# Patient Record
Sex: FEMALE | Race: WHITE | HISPANIC OR LATINO | Employment: FULL TIME | URBAN - METROPOLITAN AREA
[De-identification: names, ages, dates, MRNs, and addresses within clinical notes are randomized per-mention and may not be internally consistent; named-entity substitution may affect disease eponyms.]

---
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
## 2018-01-14 VITALS
HEIGHT: 62 IN | DIASTOLIC BLOOD PRESSURE: 62 MMHG | SYSTOLIC BLOOD PRESSURE: 110 MMHG | BODY MASS INDEX: 23.6 KG/M2 | WEIGHT: 128.25 LBS

## 2019-12-13 PROBLEM — H10.13 ALLERGIC CONJUNCTIVITIS OF BOTH EYES: Status: ACTIVE | Noted: 2019-12-13

## 2019-12-13 PROBLEM — J30.1 CHRONIC SEASONAL ALLERGIC RHINITIS DUE TO POLLEN: Status: ACTIVE | Noted: 2019-12-13

## 2019-12-13 PROBLEM — L20.84 INTRINSIC ATOPIC DERMATITIS: Status: ACTIVE | Noted: 2019-12-13

## 2019-12-13 PROBLEM — Z91.048 ALLERGY TO MOLD: Status: ACTIVE | Noted: 2019-12-13

## 2019-12-13 PROBLEM — J30.89 ALLERGIC RHINITIS DUE TO HOUSE DUST MITE: Status: ACTIVE | Noted: 2019-12-13

## 2020-01-21 ENCOUNTER — TELEPHONE (OUTPATIENT)
Dept: FAMILY MEDICINE CLINIC | Facility: CLINIC | Age: 20
End: 2020-01-21

## 2020-02-03 NOTE — TELEPHONE ENCOUNTER
02/03/20 10:16 AM     Thank you for your request  Your request has been received, reviewed, and the patient chart updated  The PCP has successfully been removed with a patient attribution note  This message will now be completed      Thank you  Pankaj Morse

## 2020-11-17 ENCOUNTER — OFFICE VISIT (OUTPATIENT)
Dept: URGENT CARE | Facility: MEDICAL CENTER | Age: 20
End: 2020-11-17
Payer: COMMERCIAL

## 2020-11-17 VITALS
TEMPERATURE: 98.9 F | WEIGHT: 125 LBS | HEART RATE: 98 BPM | HEIGHT: 60 IN | BODY MASS INDEX: 24.54 KG/M2 | RESPIRATION RATE: 18 BRPM | OXYGEN SATURATION: 98 %

## 2020-11-17 DIAGNOSIS — Z20.822 ENCOUNTER FOR LABORATORY TESTING FOR COVID-19 VIRUS: Primary | ICD-10-CM

## 2020-11-17 PROCEDURE — G0382 LEV 3 HOSP TYPE B ED VISIT: HCPCS | Performed by: PHYSICIAN ASSISTANT

## 2020-11-17 PROCEDURE — S9083 URGENT CARE CENTER GLOBAL: HCPCS | Performed by: PHYSICIAN ASSISTANT

## 2020-11-17 PROCEDURE — U0003 INFECTIOUS AGENT DETECTION BY NUCLEIC ACID (DNA OR RNA); SEVERE ACUTE RESPIRATORY SYNDROME CORONAVIRUS 2 (SARS-COV-2) (CORONAVIRUS DISEASE [COVID-19]), AMPLIFIED PROBE TECHNIQUE, MAKING USE OF HIGH THROUGHPUT TECHNOLOGIES AS DESCRIBED BY CMS-2020-01-R: HCPCS | Performed by: PHYSICIAN ASSISTANT

## 2020-11-17 RX ORDER — TRAZODONE HYDROCHLORIDE 100 MG/1
100 TABLET ORAL
COMMUNITY

## 2020-11-19 LAB — SARS-COV-2 RNA SPEC QL NAA+PROBE: NOT DETECTED

## 2024-02-21 PROBLEM — H10.13 ALLERGIC CONJUNCTIVITIS OF BOTH EYES: Status: RESOLVED | Noted: 2019-12-13 | Resolved: 2024-02-21

## 2024-10-22 ENCOUNTER — OFFICE VISIT (OUTPATIENT)
Dept: URGENT CARE | Facility: CLINIC | Age: 24
End: 2024-10-22
Payer: COMMERCIAL

## 2024-10-22 VITALS
SYSTOLIC BLOOD PRESSURE: 113 MMHG | RESPIRATION RATE: 18 BRPM | DIASTOLIC BLOOD PRESSURE: 57 MMHG | TEMPERATURE: 98.9 F | OXYGEN SATURATION: 100 % | HEART RATE: 90 BPM

## 2024-10-22 DIAGNOSIS — R10.31 RIGHT LOWER QUADRANT PAIN: Primary | ICD-10-CM

## 2024-10-22 PROCEDURE — S9088 SERVICES PROVIDED IN URGENT: HCPCS | Performed by: PHYSICIAN ASSISTANT

## 2024-10-22 PROCEDURE — 99214 OFFICE O/P EST MOD 30 MIN: CPT | Performed by: PHYSICIAN ASSISTANT

## 2024-10-22 NOTE — PROGRESS NOTES
[facility]        NAME: Carolyn Russ is a 24 y.o. female  : 2000    MRN: [id number]  DATE: 2024  TIME: 7:36 PM    Assessment and Plan   Right lower quadrant pain [R10.31]  1. Right lower quadrant pain          ED for further evaluation.    Patient Instructions       ED for further evaluation.    If tests have been performed at [facility], our office will contact you with results if changes need to be made to the care plan discussed with you at the visit.  You can review your full results on [facility] MyChart.    Chief Complaint     Chief Complaint   Patient presents with    Abdominal Pain     C/o ovarian cyst x 2 weeks rlq         History of Present Illness       Patient is a 24 year old female presenting to [facility] with right lower quadrant abdominal pain.  Patient reports pain has been intermittent for a few months and worsened today.  Pain is constant and described as a pressure sensation.   Pain is worse with direct palpation.  Pt believes it is an ovarian cyst but has never had any imaging or official diagnosis, just assumes.     Abdominal Pain  This is a new problem. The current episode started more than 1 month ago. The onset quality is gradual. The problem occurs constantly. The problem has been gradually worsening. The pain is located in the RLQ. Pertinent negatives include no arthralgias, dysuria, fever, hematuria or vomiting.       Review of Systems   Review of Systems   Constitutional:  Negative for chills and fever.   HENT:  Negative for ear pain and sore throat.    Eyes:  Negative for pain and visual disturbance.   Respiratory:  Negative for cough and shortness of breath.    Cardiovascular:  Negative for chest pain and palpitations.   Gastrointestinal:  Positive for abdominal pain. Negative for vomiting.   Genitourinary:  Negative for dysuria and hematuria.   Musculoskeletal:  Negative for arthralgias and back pain.   Skin:  Negative for color change and rash.    Neurological:  Negative for seizures and syncope.   All other systems reviewed and are negative.        Current Medications       Current Outpatient Medications:     montelukast (SINGULAIR) 10 mg tablet, Take 1 tablet (10 mg total) by mouth daily at bedtime (Patient not taking: Reported on 11/17/2020), Disp: 30 tablet, Rfl: 11    norgestimate-ethinyl estradiol (SPRINTEC 28) 0.25-35 MG-MCG per tablet, Take 1 tablet by mouth daily, Disp: , Rfl:     sertraline (ZOLOFT) 50 mg tablet, Take 50 mg by mouth daily (Patient not taking: Reported on 10/22/2024), Disp: , Rfl:     traZODone (DESYREL) 100 mg tablet, Take 100 mg by mouth daily at bedtime (Patient not taking: Reported on 10/22/2024), Disp: , Rfl:     Current Allergies     Allergies as of 10/22/2024    (No Known Allergies)            The following portions of the patient's history were reviewed and updated as appropriate: allergies, current medications, past family history, past medical history, past social history, past surgical history and problem list.     Past Medical History:   Diagnosis Date    Allergies     Anxiety     Asthma     Wears glasses        Past Surgical History:   Procedure Laterality Date    OTHER SURGICAL HISTORY      repair of eyelid after trauma     WISDOM TOOTH EXTRACTION      X4        Family History   Problem Relation Age of Onset    Asthma Mother     Allergies Mother     Arthritis Mother     Cirrhosis Paternal Grandfather         LIVER    Liver cancer Paternal Grandfather     Diabetes Other     Obesity Father     Allergies Sister          Medications have been verified.        Objective   /57   Pulse 90   Temp 98.9 °F (37.2 °C)   Resp 18   SpO2 100%   No LMP recorded.       Physical Exam     Physical Exam  Constitutional:       Appearance: Normal appearance.   HENT:      Head: Normocephalic and atraumatic.      Nose: Nose normal.      Mouth/Throat:      Mouth: Mucous membranes are moist.   Eyes:      Extraocular Movements:  Extraocular movements intact.      Conjunctiva/sclera: Conjunctivae normal.      Pupils: Pupils are equal, round, and reactive to light.   Cardiovascular:      Rate and Rhythm: Normal rate.   Pulmonary:      Effort: Pulmonary effort is normal.   Abdominal:      Tenderness: There is abdominal tenderness (RLQ).   Musculoskeletal:         General: Normal range of motion.      Cervical back: Normal range of motion and neck supple.   Skin:     General: Skin is warm and dry.      Capillary Refill: Capillary refill takes less than 2 seconds.   Neurological:      General: No focal deficit present.      Mental Status: She is alert and oriented to person, place, and time.   Psychiatric:         Mood and Affect: Mood normal.         Behavior: Behavior normal.

## 2024-11-24 ENCOUNTER — OFFICE VISIT (OUTPATIENT)
Dept: URGENT CARE | Facility: CLINIC | Age: 24
End: 2024-11-24
Payer: COMMERCIAL

## 2024-11-24 VITALS
RESPIRATION RATE: 18 BRPM | DIASTOLIC BLOOD PRESSURE: 57 MMHG | HEART RATE: 84 BPM | TEMPERATURE: 98.5 F | SYSTOLIC BLOOD PRESSURE: 116 MMHG | OXYGEN SATURATION: 100 %

## 2024-11-24 DIAGNOSIS — J02.9 SORE THROAT: Primary | ICD-10-CM

## 2024-11-24 DIAGNOSIS — R09.82 POSTNASAL DRIP: ICD-10-CM

## 2024-11-24 PROCEDURE — 99213 OFFICE O/P EST LOW 20 MIN: CPT | Performed by: PHYSICIAN ASSISTANT

## 2024-11-24 PROCEDURE — S9088 SERVICES PROVIDED IN URGENT: HCPCS | Performed by: PHYSICIAN ASSISTANT

## 2024-11-24 RX ORDER — BROMPHENIRAMINE MALEATE, PSEUDOEPHEDRINE HYDROCHLORIDE, AND DEXTROMETHORPHAN HYDROBROMIDE 2; 30; 10 MG/5ML; MG/5ML; MG/5ML
5 SYRUP ORAL 4 TIMES DAILY PRN
Qty: 118 ML | Refills: 0 | Status: SHIPPED | OUTPATIENT
Start: 2024-11-24

## 2024-11-24 NOTE — PATIENT INSTRUCTIONS
Discussed symptoms most likely related to postnasal drip.  Take Bromfed as instructed.  Continue over-the-counter ibuprofen or Tylenol for any pain or discomfort.  All patient's questions answered and is agreeable this plan.

## 2024-11-24 NOTE — PROGRESS NOTES
[facility]        NAME: Carolyn Russ is a 24 y.o. female  : 2000    MRN: [id number]  DATE: 2024  TIME: 2:58 PM    Assessment and Plan   Sore throat [J02.9]  1. Sore throat  brompheniramine-pseudoephedrine-DM 30-2-10 MG/5ML syrup      2. Postnasal drip              Patient Instructions     Patient Instructions   Discussed symptoms most likely related to postnasal drip.  Take Bromfed as instructed.  Continue over-the-counter ibuprofen or Tylenol for any pain or discomfort.  All patient's questions answered and is agreeable this plan.      Follow up with PCP in 3-5 days.  Proceed to  ER if symptoms worsen.    Chief Complaint     Chief Complaint   Patient presents with    Cold Like Symptoms     Sore throat x4 days, chest pain, cough x 4 today           History of Present Illness       Patient is a 24-year-old female presenting today with cold-like symptoms x 3 to 4 days.  Patient notes over the last few days she has had some nasal congestion, postnasal drip, sore throat and a cough, has taken occasional over-the-counter ibuprofen which helps slightly.  Denies any known sick contacts.  Denies fever, chest tightness, SOB, N/B/D.        Review of Systems   Review of Systems   Constitutional:  Negative for chills, fatigue and fever.   HENT:  Positive for congestion, postnasal drip and sore throat.    Eyes:  Negative for redness and itching.   Respiratory:  Positive for cough. Negative for chest tightness and shortness of breath.    Cardiovascular:  Negative for chest pain.   Gastrointestinal:  Negative for diarrhea, nausea and vomiting.   Musculoskeletal:  Negative for arthralgias and myalgias.   Neurological:  Negative for light-headedness and headaches.         Current Medications       Current Outpatient Medications:     brompheniramine-pseudoephedrine-DM 30-2-10 MG/5ML syrup, Take 5 mL by mouth 4 (four) times a day as needed for allergies, Disp: 118 mL, Rfl: 0    montelukast (SINGULAIR) 10  mg tablet, Take 1 tablet (10 mg total) by mouth daily at bedtime (Patient not taking: Reported on 11/17/2020), Disp: 30 tablet, Rfl: 11    norgestimate-ethinyl estradiol (SPRINTEC 28) 0.25-35 MG-MCG per tablet, Take 1 tablet by mouth daily, Disp: , Rfl:     sertraline (ZOLOFT) 50 mg tablet, Take 50 mg by mouth daily (Patient not taking: Reported on 10/22/2024), Disp: , Rfl:     traZODone (DESYREL) 100 mg tablet, Take 100 mg by mouth daily at bedtime (Patient not taking: Reported on 10/22/2024), Disp: , Rfl:     Current Allergies     Allergies as of 11/24/2024    (No Known Allergies)            The following portions of the patient's history were reviewed and updated as appropriate: allergies, current medications, past family history, past medical history, past social history, past surgical history and problem list.     Past Medical History:   Diagnosis Date    Allergies     Anxiety     Asthma     Wears glasses        Past Surgical History:   Procedure Laterality Date    OTHER SURGICAL HISTORY      repair of eyelid after trauma     WISDOM TOOTH EXTRACTION      X4        Family History   Problem Relation Age of Onset    Asthma Mother     Allergies Mother     Arthritis Mother     Cirrhosis Paternal Grandfather         LIVER    Liver cancer Paternal Grandfather     Diabetes Other     Obesity Father     Allergies Sister          Medications have been verified.        Objective   /57   Pulse 84   Temp 98.5 °F (36.9 °C)   Resp 18   SpO2 100%        Physical Exam     Physical Exam  Vitals reviewed.   Constitutional:       General: She is not in acute distress.     Appearance: She is not ill-appearing.   HENT:      Head: Normocephalic.      Right Ear: Tympanic membrane, ear canal and external ear normal.      Left Ear: Tympanic membrane, ear canal and external ear normal.      Nose: Congestion present.      Mouth/Throat:      Mouth: Mucous membranes are moist.      Pharynx: No oropharyngeal exudate.      Comments:  Mild erythema and cobblestoning of oropharynx consistent with postnasal drip  Cardiovascular:      Rate and Rhythm: Normal rate and regular rhythm.      Pulses: Normal pulses.      Heart sounds: Normal heart sounds.   Pulmonary:      Effort: Pulmonary effort is normal.      Breath sounds: Normal breath sounds.   Lymphadenopathy:      Cervical: No cervical adenopathy.   Skin:     General: Skin is warm.      Capillary Refill: Capillary refill takes less than 2 seconds.   Neurological:      General: No focal deficit present.      Mental Status: She is alert and oriented to person, place, and time.

## 2024-12-03 ENCOUNTER — OFFICE VISIT (OUTPATIENT)
Dept: URGENT CARE | Facility: CLINIC | Age: 24
End: 2024-12-03
Payer: COMMERCIAL

## 2024-12-03 VITALS
TEMPERATURE: 98.6 F | BODY MASS INDEX: 28.24 KG/M2 | SYSTOLIC BLOOD PRESSURE: 104 MMHG | HEART RATE: 98 BPM | WEIGHT: 144.6 LBS | DIASTOLIC BLOOD PRESSURE: 56 MMHG | OXYGEN SATURATION: 100 %

## 2024-12-03 DIAGNOSIS — J02.9 SORE THROAT: Primary | ICD-10-CM

## 2024-12-03 LAB — S PYO AG THROAT QL: NEGATIVE

## 2024-12-03 PROCEDURE — 99213 OFFICE O/P EST LOW 20 MIN: CPT | Performed by: PHYSICIAN ASSISTANT

## 2024-12-03 PROCEDURE — 87880 STREP A ASSAY W/OPTIC: CPT | Performed by: PHYSICIAN ASSISTANT

## 2024-12-03 PROCEDURE — S9088 SERVICES PROVIDED IN URGENT: HCPCS | Performed by: PHYSICIAN ASSISTANT

## 2024-12-03 RX ORDER — PREDNISONE 50 MG/1
50 TABLET ORAL DAILY
Qty: 5 TABLET | Refills: 0 | Status: SHIPPED | OUTPATIENT
Start: 2024-12-03 | End: 2024-12-08

## 2024-12-03 NOTE — PATIENT INSTRUCTIONS
I have prescribed an antibiotic for the infection.  Please take the antibiotic as prescribed and finish the entire prescription.  Take an over the counter probiotic or eat yogurt with live cultures in it (activia) to keep good bacteria in the gut and help prevent diarrhea.  Wash hands frequently to prevent the spread of infection.  Can use over the counter cough and cold medications to help with symptoms.  Ibuprofen and/or tylenol as needed for pain or fever.  If not improving over the next 7-10 days, follow up with PCP.      Prednisone as directed.

## 2024-12-03 NOTE — PROGRESS NOTES
[facility]    NAME: Carolyn Russ is a 24 y.o. female  : 2000    MRN: [id number]  DATE: December 3, 2024  TIME: 5:23 PM    Assessment and Plan   Sore throat [J02.9]  1. Sore throat  POCT rapid strepA    amoxicillin-clavulanate (AUGMENTIN) 875-125 mg per tablet    predniSONE 50 mg tablet          Patient Instructions     Patient Instructions   I have prescribed an antibiotic for the infection.  Please take the antibiotic as prescribed and finish the entire prescription.  Take an over the counter probiotic or eat yogurt with live cultures in it (activia) to keep good bacteria in the gut and help prevent diarrhea.  Wash hands frequently to prevent the spread of infection.  Can use over the counter cough and cold medications to help with symptoms.  Ibuprofen and/or tylenol as needed for pain or fever.  If not improving over the next 7-10 days, follow up with PCP.      Prednisone as directed.      Chief Complaint     Chief Complaint   Patient presents with    Sore Throat     Ear pain, arm right about 3 weeks        History of Present Illness   24 year old female here with complaint of sore throat, right ear pain for the past 3 weeks.  No improvement with medication she was given last visit.        Review of Systems   Review of Systems   Constitutional:  Negative for appetite change, chills and fever.   HENT:  Positive for ear pain and sore throat. Negative for congestion, ear discharge, facial swelling, postnasal drip, sinus pressure and sneezing.    Respiratory:  Positive for cough. Negative for shortness of breath and wheezing.    Neurological:  Negative for headaches.       Current Medications     Current Outpatient Medications:     amoxicillin-clavulanate (AUGMENTIN) 875-125 mg per tablet, Take 1 tablet by mouth every 12 (twelve) hours for 10 days, Disp: 20 tablet, Rfl: 0    predniSONE 50 mg tablet, Take 1 tablet (50 mg total) by mouth daily for 5 days, Disp: 5 tablet, Rfl: 0     brompheniramine-pseudoephedrine-DM 30-2-10 MG/5ML syrup, Take 5 mL by mouth 4 (four) times a day as needed for allergies, Disp: 118 mL, Rfl: 0    montelukast (SINGULAIR) 10 mg tablet, Take 1 tablet (10 mg total) by mouth daily at bedtime (Patient not taking: Reported on 11/17/2020), Disp: 30 tablet, Rfl: 11    norgestimate-ethinyl estradiol (SPRINTEC 28) 0.25-35 MG-MCG per tablet, Take 1 tablet by mouth daily, Disp: , Rfl:     sertraline (ZOLOFT) 50 mg tablet, Take 50 mg by mouth daily (Patient not taking: Reported on 10/22/2024), Disp: , Rfl:     traZODone (DESYREL) 100 mg tablet, Take 100 mg by mouth daily at bedtime (Patient not taking: Reported on 10/22/2024), Disp: , Rfl:     Current Allergies     Allergies as of 12/03/2024    (No Known Allergies)          The following portions of the patient's history were reviewed and updated as appropriate: allergies, current medications, past family history, past medical history, past social history, past surgical history and problem list.   Past Medical History:   Diagnosis Date    Allergies     Anxiety     Asthma     Wears glasses      Past Surgical History:   Procedure Laterality Date    OTHER SURGICAL HISTORY      repair of eyelid after trauma     WISDOM TOOTH EXTRACTION      X4      Family History   Problem Relation Age of Onset    Asthma Mother     Allergies Mother     Arthritis Mother     Cirrhosis Paternal Grandfather         LIVER    Liver cancer Paternal Grandfather     Diabetes Other     Obesity Father     Allergies Sister      Social History     Socioeconomic History    Marital status: Single     Spouse name: Not on file    Number of children: 0    Years of education: Not on file    Highest education level: Not on file   Occupational History    Occupation: [employer]      Comment: full-time    Tobacco Use    Smoking status: Never    Smokeless tobacco: Never   Vaping Use    Vaping status: Never Used   Substance and Sexual Activity    Alcohol use: Not Currently      Comment: No use    Drug use: Never     Comment: No use    Sexual activity: Not on file   Other Topics Concern    Not on file   Social History Narrative    Always uses seatbelts    No caffeine use        Patient lives in an apartment     Eelctric/forced hot air heat     No basement     No dehumidifier     Humidifier used daily     No air  or purifiers     Home is smoke free     [place]     Carpet yung in the bedroom         Cat (Darline) and she is allowed in the bedroom     Dog (Carolyn and Elida) they are allowed in the bedroom         Caffeine: 1 cup of hot tea daily                     Soda is consumed rarely     Chocolate consumed rarely         Patient has 3 roommates      Social Drivers of Health     Financial Resource Strain: Not on file   Food Insecurity: Not on file   Transportation Needs: Not on file   Physical Activity: Inactive (12/13/2019)    Exercise Vital Sign     Days of Exercise per Week: 0 days     Minutes of Exercise per Session: 0 min   Stress: Not on file   Social Connections: Not on file   Intimate Partner Violence: Not on file   Housing Stability: Not on file     Medications have been verified.    Objective   /56   Pulse 98   Temp 98.6 °F (37 °C)   Wt 65.6 kg (144 lb 9.6 oz)   SpO2 100%   BMI 28.24 kg/m²      Physical Exam   Physical Exam  Vitals and nursing note reviewed.   Constitutional:       General: She is not in acute distress.     Appearance: She is well-developed.   HENT:      Head: Normocephalic and atraumatic.      Right Ear: Tympanic membrane normal. Tympanic membrane is not erythematous.      Left Ear: Tympanic membrane normal. Tympanic membrane is not erythematous.      Nose: Congestion present. No mucosal edema.      Right Sinus: No maxillary sinus tenderness or frontal sinus tenderness.      Left Sinus: No maxillary sinus tenderness or frontal sinus tenderness.      Mouth/Throat:      Pharynx: Posterior oropharyngeal erythema  present. No oropharyngeal exudate.   Eyes:      Conjunctiva/sclera: Conjunctivae normal.   Cardiovascular:      Rate and Rhythm: Normal rate and regular rhythm.      Heart sounds: Normal heart sounds. No murmur heard.

## 2025-03-10 ENCOUNTER — OFFICE VISIT (OUTPATIENT)
Dept: SURGERY | Facility: CLINIC | Age: 25
End: 2025-03-10
Payer: COMMERCIAL

## 2025-03-10 ENCOUNTER — TELEPHONE (OUTPATIENT)
Dept: SURGERY | Facility: CLINIC | Age: 25
End: 2025-03-10

## 2025-03-10 VITALS
HEART RATE: 112 BPM | TEMPERATURE: 98.1 F | OXYGEN SATURATION: 100 % | BODY MASS INDEX: 25.95 KG/M2 | SYSTOLIC BLOOD PRESSURE: 96 MMHG | WEIGHT: 141 LBS | HEIGHT: 62 IN | DIASTOLIC BLOOD PRESSURE: 50 MMHG

## 2025-03-10 DIAGNOSIS — L05.91 INFECTED PILONIDAL CYST: Primary | ICD-10-CM

## 2025-03-10 PROCEDURE — 87070 CULTURE OTHR SPECIMN AEROBIC: CPT | Performed by: PHYSICIAN ASSISTANT

## 2025-03-10 PROCEDURE — 99203 OFFICE O/P NEW LOW 30 MIN: CPT | Performed by: PHYSICIAN ASSISTANT

## 2025-03-10 PROCEDURE — 87077 CULTURE AEROBIC IDENTIFY: CPT | Performed by: PHYSICIAN ASSISTANT

## 2025-03-10 PROCEDURE — 10080 I&D PILONIDAL CYST SIMPLE: CPT | Performed by: PHYSICIAN ASSISTANT

## 2025-03-10 PROCEDURE — 87205 SMEAR GRAM STAIN: CPT | Performed by: PHYSICIAN ASSISTANT

## 2025-03-10 RX ORDER — SULFAMETHOXAZOLE AND TRIMETHOPRIM 800; 160 MG/1; MG/1
1 TABLET ORAL EVERY 12 HOURS SCHEDULED
Qty: 14 TABLET | Refills: 0 | Status: SHIPPED | OUTPATIENT
Start: 2025-03-10 | End: 2025-03-11 | Stop reason: SDUPTHER

## 2025-03-10 NOTE — TELEPHONE ENCOUNTER
Patient calling in. Would like her prescription for the Bactrim that was prescribed today by Agustín Park PA-C be sent to : [id number] - [place] - [address] [facility] [address]

## 2025-03-10 NOTE — ASSESSMENT & PLAN NOTE
24 F with chronic hx of pilonidal disease x 7 years requiring 3-4 prior I&D presenting with acute pain and swelling without drainage. On exam there is a fluctuant pilonidal abscess. Advised I&D in office today and informed verbal consent obtained. Will start antibiotics. She expressed interest in definitive treatment, will arrange follow-up with Dr. Catherine to discuss pilonidal cystectomy.    Procedure completed with single straight incision over area of maximal fluctuance, using a prior scar. Large volume purulent fluid expressed and small strip of packing placed with instructions to remove tomorrow.    Orders:    sulfamethoxazole-trimethoprim (BACTRIM DS) 800-160 mg per tablet; Take 1 tablet by mouth every 12 (twelve) hours for 7 days

## 2025-03-10 NOTE — PROGRESS NOTES
Name: Carolyn Russ      : 2000      MRN: [id number]  Encounter Provider: Agustín Park PA-C  Encounter Date: 3/10/2025   Encounter department: [facility] GENERAL SURGERY [place]  :  Assessment & Plan  Infected pilonidal cyst  24 F with chronic hx of pilonidal disease x 7 years requiring 3-4 prior I&D presenting with acute pain and swelling without drainage. On exam there is a fluctuant pilonidal abscess. Advised I&D in office today and informed verbal consent obtained. Will start antibiotics. She expressed interest in definitive treatment, will arrange follow-up with Dr. Catherine to discuss pilonidal cystectomy.    Procedure completed with single straight incision over area of maximal fluctuance, using a prior scar. Large volume purulent fluid expressed and small strip of packing placed with instructions to remove tomorrow.    Orders:    sulfamethoxazole-trimethoprim (BACTRIM DS) 800-160 mg per tablet; Take 1 tablet by mouth every 12 (twelve) hours for 7 days        History of Present Illness   HPI  Carolyn Russ is a 24 y.o. female who presents with a recurrent pilonidal cyst that she has been dealing with since she was 17. She had a couple of I&D's but never had the sack removed. This recent flare up she has been dealing with for a week. Theres swelling and redness but no drainage or fevers/chills. She is not on any abx right now. Pt suffers from a aching pain that prevent her from sitting and causes issues with having BM. Pt takes IBU but theres no relief. Patient denies any allergies and denies any blood thinners. SPENCER Giraldo  History obtained from: patient    Review of Systems   All other systems reviewed and are negative.    Past Medical History   Past Medical History:   Diagnosis Date    Allergies     Anxiety     Asthma     Wears glasses      Past Surgical History:   Procedure Laterality Date    OTHER SURGICAL HISTORY      repair of eyelid after trauma     WISDOM TOOTH EXTRACTION      X4   "    Family History   Problem Relation Age of Onset    Asthma Mother     Allergies Mother     Arthritis Mother     Cirrhosis Paternal Grandfather         LIVER    Liver cancer Paternal Grandfather     Diabetes Other     Obesity Father     Allergies Sister       reports that she has never smoked. She has never used smokeless tobacco. She reports that she does not currently use alcohol. She reports that she does not use drugs.  Current Outpatient Medications   Medication Instructions    sulfamethoxazole-trimethoprim (BACTRIM DS) 800-160 mg per tablet 1 tablet, Oral, Every 12 hours scheduled   No Known Allergies      Objective   BP 96/50 (BP Location: Left arm, Patient Position: Sitting, Cuff Size: Standard)   Pulse (!) 112   Temp 98.1 °F (36.7 °C) (Temporal)   Ht 5' 2\" (1.575 m)   Wt 64 kg (141 lb)   SpO2 100%   BMI 25.79 kg/m²      Physical Exam  Vitals and nursing note reviewed.   Constitutional:       General: She is not in acute distress.     Appearance: She is well-developed. She is not diaphoretic.   HENT:      Head: Normocephalic and atraumatic.   Eyes:      Conjunctiva/sclera: Conjunctivae normal.      Pupils: Pupils are equal, round, and reactive to light.   Pulmonary:      Effort: No respiratory distress.   Musculoskeletal:         General: Normal range of motion.      Cervical back: Normal range of motion.   Skin:     General: Skin is warm and dry.      Capillary Refill: Capillary refill takes less than 2 seconds.      Comments: 5 cm infected pilonidal cyst abscess without open wound or drainage. Small scar noted just left of midline.   Neurological:      Mental Status: She is alert and oriented to person, place, and time.   Psychiatric:         Behavior: Behavior normal.       Incision and Drainage    Date/Time: 3/10/2025 10:00 AM    Performed by: Agustín Park PA-C  Authorized by: Agustín Park PA-C  [place] Protocol:  procedure performed by consultantConsent: Verbal consent " "obtained.  Risks and benefits: risks, benefits and alternatives were discussed  Consent given by: patient  Patient understanding: patient does not state understanding of the procedure being performed  Patient identity confirmed: verbally with patient    Patient location:  Clinic  Location:     Type:  Abscess and pilonidal cyst    Size:  5  Pre-procedure details:     Skin preparation:  Betadine  Anesthesia (see MAR for exact dosages):     Anesthesia method:  Local infiltration    Local anesthetic:  Lidocaine 1% WITH epi  Procedure details:     Complexity:  Intermediate    Incision types:  Single straight    Scalpel blade:  11    Approach:  Open    Incision depth:  Subcutaneous    Wound management:  Probed and deloculated and irrigated with saline    Drainage:  Purulent    Drainage amount:  Copious    Wound treatment:  Packing placed    Packing materials:  1/4 in gauze    Amount 1/4\":  6 inches impregnated with silversorb gel  Post-procedure details:     Patient tolerance of procedure:  Tolerated well, no immediate complications        "

## 2025-03-11 RX ORDER — SULFAMETHOXAZOLE AND TRIMETHOPRIM 800; 160 MG/1; MG/1
1 TABLET ORAL EVERY 12 HOURS SCHEDULED
Qty: 14 TABLET | Refills: 0 | Status: SHIPPED | OUTPATIENT
Start: 2025-03-11 | End: 2025-03-19

## 2025-03-14 LAB
BACTERIA WND AEROBE CULT: ABNORMAL
BACTERIA WND AEROBE CULT: ABNORMAL
GRAM STN SPEC: ABNORMAL

## 2025-03-18 NOTE — PROGRESS NOTES
Name: Carolyn Russ      : 2000      MRN: [id number]  Encounter Provider: Nahid Catherine MD  Encounter Date: 3/19/2025   Encounter department: [facility] GENERAL SURGERY [place]  :  Assessment & Plan        History of Present Illness   HPI  Carolyn Russ is a 24 y.o. female who presents for a wound check pilonidal cyst, discuss surgery       Review of Systems       Objective   There were no vitals taken for this visit.     Physical Exam

## 2025-03-19 ENCOUNTER — OFFICE VISIT (OUTPATIENT)
Dept: SURGERY | Facility: CLINIC | Age: 25
End: 2025-03-19
Payer: COMMERCIAL

## 2025-03-19 VITALS
DIASTOLIC BLOOD PRESSURE: 72 MMHG | HEART RATE: 95 BPM | TEMPERATURE: 98.3 F | BODY MASS INDEX: 25.76 KG/M2 | HEIGHT: 62 IN | OXYGEN SATURATION: 99 % | SYSTOLIC BLOOD PRESSURE: 114 MMHG | WEIGHT: 140 LBS

## 2025-03-19 DIAGNOSIS — L05.91 INFECTED PILONIDAL CYST: Primary | ICD-10-CM

## 2025-03-19 PROCEDURE — 99212 OFFICE O/P EST SF 10 MIN: CPT | Performed by: SURGERY

## 2025-03-19 NOTE — ASSESSMENT & PLAN NOTE
Patient returns for routine scheduled follow-up status post I&D of an infected pilonidal cyst.    Patient voiced no new complaints today.    On physical examination the wound is well-healed.    We discussed the natural history of pilonidal cysts and the surgical remedies for this troublesome condition.    A jayjay and honest conversation was had regarding the frequency of postsurgical wound complications necessitating additional office visits and interventions.    All questions answered to the satisfaction of the patient.  I look forward to seeing her back in a month's time.

## 2025-03-19 NOTE — PROGRESS NOTES
Name: Carolyn Russ      : 2000      MRN: [id number]  Encounter Provider: Nahid Catherine MD  Encounter Date: 3/19/2025   Encounter department: [facility] GENERAL SURGERY [place]  :  Assessment & Plan  Infected pilonidal cyst  Patient returns for routine scheduled follow-up status post I&D of an infected pilonidal cyst.    Patient voiced no new complaints today.    On physical examination the wound is well-healed.    We discussed the natural history of pilonidal cysts and the surgical remedies for this troublesome condition.    A jayjay and honest conversation was had regarding the frequency of postsurgical wound complications necessitating additional office visits and interventions.    All questions answered to the satisfaction of the patient.  I look forward to seeing her back in a month's time.             History of Present Illness   HPI  Carolyn Russ is a 24 y.o. female who presents for a wound check, s/p I&D of pilonidal cyst 03/10/25 with KATHY Maldonado. Pt states the wound is healed and denies any drainage, redness/irritation or pain but the area is . Pt  is still currently taking bactrim for abx; she has 2 pills left and denies any side effects. No fevers/chills. Amilia.O,MA  History obtained from: patient    Review of Systems   Constitutional:  Negative for chills and fever.   HENT:  Negative for ear pain and sore throat.    Eyes:  Negative for pain and visual disturbance.   Respiratory:  Negative for cough and shortness of breath.    Cardiovascular:  Negative for chest pain and palpitations.   Gastrointestinal:  Negative for abdominal pain and vomiting.   Genitourinary:  Negative for dysuria and hematuria.   Musculoskeletal:  Negative for arthralgias and back pain.   Skin:  Negative for color change and rash.   Neurological:  Negative for seizures and syncope.   All other systems reviewed and are negative.    Pertinent Medical History           Medical History Reviewed by provider this  "encounter:     .  Past Medical History   Past Medical History:   Diagnosis Date    Allergies     Anxiety     Asthma     Wears glasses      Past Surgical History:   Procedure Laterality Date    OTHER SURGICAL HISTORY      repair of eyelid after trauma     WISDOM TOOTH EXTRACTION      X4      Family History   Problem Relation Age of Onset    Asthma Mother     Allergies Mother     Arthritis Mother     Cirrhosis Paternal Grandfather         LIVER    Liver cancer Paternal Grandfather     Diabetes Other     Obesity Father     Allergies Sister       reports that she has never smoked. She has never used smokeless tobacco. She reports that she does not currently use alcohol. She reports that she does not use drugs.  Current Outpatient Medications   Medication Instructions    sulfamethoxazole-trimethoprim (BACTRIM DS) 800-160 mg per tablet 1 tablet, Oral, Every 12 hours scheduled   No Known Allergies   Current Outpatient Medications on File Prior to Visit   Medication Sig Dispense Refill    sulfamethoxazole-trimethoprim (BACTRIM DS) 800-160 mg per tablet Take 1 tablet by mouth every 12 (twelve) hours for 7 days 14 tablet 0     No current facility-administered medications on file prior to visit.      Social History     Tobacco Use    Smoking status: Never    Smokeless tobacco: Never   Vaping Use    Vaping status: Never Used   Substance and Sexual Activity    Alcohol use: Not Currently     Comment: No use    Drug use: Never     Comment: No use    Sexual activity: Not on file        Objective   /72 (BP Location: Left arm, Patient Position: Sitting, Cuff Size: Standard)   Pulse 95   Temp 98.3 °F (36.8 °C) (Temporal)   Ht 5' 2\" (1.575 m)   Wt 63.5 kg (140 lb)   SpO2 99%   BMI 25.61 kg/m²      Physical Exam  Vitals and nursing note reviewed.   Constitutional:       General: She is not in acute distress.     Appearance: She is well-developed.   HENT:      Head: Normocephalic and atraumatic.   Eyes:      " Conjunctiva/sclera: Conjunctivae normal.   Cardiovascular:      Rate and Rhythm: Normal rate and regular rhythm.      Heart sounds: No murmur heard.  Pulmonary:      Effort: Pulmonary effort is normal. No respiratory distress.      Breath sounds: Normal breath sounds.   Abdominal:      Palpations: Abdomen is soft.      Tenderness: There is no abdominal tenderness.   Musculoskeletal:         General: No swelling.      Cervical back: Neck supple.   Skin:     General: Skin is warm and dry.      Capillary Refill: Capillary refill takes less than 2 seconds.   Neurological:      Mental Status: She is alert.   Psychiatric:         Mood and Affect: Mood normal.         Administrative Statements   I have spent a total time of 20 minutes in caring for this patient on the day of the visit/encounter including Prognosis, Risks and benefits of tx options, Patient and family education, and Impressions.

## 2025-03-26 ENCOUNTER — APPOINTMENT (OUTPATIENT)
Dept: RADIOLOGY | Facility: CLINIC | Age: 25
End: 2025-03-26
Payer: COMMERCIAL

## 2025-03-26 ENCOUNTER — OFFICE VISIT (OUTPATIENT)
Dept: URGENT CARE | Facility: CLINIC | Age: 25
End: 2025-03-26
Payer: COMMERCIAL

## 2025-03-26 VITALS
RESPIRATION RATE: 18 BRPM | OXYGEN SATURATION: 100 % | TEMPERATURE: 98.7 F | HEART RATE: 93 BPM | DIASTOLIC BLOOD PRESSURE: 65 MMHG | SYSTOLIC BLOOD PRESSURE: 110 MMHG

## 2025-03-26 DIAGNOSIS — J20.9 ACUTE BRONCHITIS, UNSPECIFIED ORGANISM: Primary | ICD-10-CM

## 2025-03-26 DIAGNOSIS — R07.89 CHEST TIGHTNESS: ICD-10-CM

## 2025-03-26 PROCEDURE — 99214 OFFICE O/P EST MOD 30 MIN: CPT

## 2025-03-26 PROCEDURE — 71046 X-RAY EXAM CHEST 2 VIEWS: CPT

## 2025-03-26 RX ORDER — ALBUTEROL SULFATE 90 UG/1
2 INHALANT RESPIRATORY (INHALATION) EVERY 6 HOURS PRN
Qty: 8.5 G | Refills: 0 | Status: SHIPPED | OUTPATIENT
Start: 2025-03-26

## 2025-03-26 RX ORDER — PREDNISONE 10 MG/1
TABLET ORAL
Qty: 26 TABLET | Refills: 0 | Status: SHIPPED | OUTPATIENT
Start: 2025-03-26

## 2025-03-26 NOTE — PATIENT INSTRUCTIONS
Please start a Prednisone taper daily as directed.  Please take steroids with food and do not take any Ibuprofen or other NSAID containing medications as this may increase the risk for GI bleeding. You may take Tylenol if needed.      You may use Albuterol inhaler every 6 hours as needed for cough, wheezing, shortness of breath or chest tightness.     Most colds are caused by viruses, which do not respond to antibiotics. Typically, viruses are self limiting and most people recover in 7-10 days.     While sick, make sure you get lots of rest and increase your fluid intake.   You may use OTC nasal saline spray, Flonase, and Mucinex for mild congestion.   Take Tylenol for fever, mild pain, and/or body aches.  Perform salt water gargles, drink warm tea with honey, and use throat lozenges and Chloraseptic spray for sore throat.    You can also apply warm compresses over your sinuses for any sinus pressure.     While you are sick, taking vitamins may help boost your immune system and potentially aid in recovery by supporting your body's natural defense mechanisms: Vitamin D3 2000 IU daily, Vitamin C 1000mg daily , and Multivitamin daily     If your symptoms do not improve with our current treatment plan or worsen, please schedule an appointment with your PCP. If you develop any high or persistent fevers, chest pain, palpitations, shortness of breath, difficulty swallowing, decreased urine output, abdominal pain, dizziness or any other concerning symptoms, please proceed to the ED.

## 2025-03-26 NOTE — PROGRESS NOTES
[facility]        NAME: Carolyn Russ is a 24 y.o. female  : 2000    MRN: [id number]  DATE: 2025  TIME: 3:03 PM    Assessment and Plan   Acute bronchitis, unspecified organism [J20.9]  1. Acute bronchitis, unspecified organism  predniSONE 10 mg tablet    albuterol (ProAir HFA) 90 mcg/act inhaler      2. Chest tightness  XR chest pa and lateral        Chest XR obtained in office. Xray imaging reviewed and negative for any acute cardiopulmonary abnormality or consolidation. Pending final read from radiology.  Patient's oxygen saturation 100% in office.  Patient has no adventitious breath sounds on auscultation.  Given history of asthma and chest tightness, will treat with short course of steroids.  Albuterol inhaler refilled. Instructed patient to follow-up with PCP for no improvement or worsening of symptoms.  Patient educated on red flag symptoms and when to proceed to the ED.  Patient agreeable and understands current treatment plan.     Patient Instructions     Patient Instructions   Please start a Prednisone taper daily as directed.  Please take steroids with food and do not take any Ibuprofen or other NSAID containing medications as this may increase the risk for GI bleeding. You may take Tylenol if needed.      You may use Albuterol inhaler every 6 hours as needed for cough, wheezing, shortness of breath or chest tightness.     Most colds are caused by viruses, which do not respond to antibiotics. Typically, viruses are self limiting and most people recover in 7-10 days.     While sick, make sure you get lots of rest and increase your fluid intake.   You may use OTC nasal saline spray, Flonase, and Mucinex for mild congestion.   Take Tylenol for fever, mild pain, and/or body aches.  Perform salt water gargles, drink warm tea with honey, and use throat lozenges and Chloraseptic spray for sore throat.    You can also apply warm compresses over your sinuses for any sinus pressure.      While you are sick, taking vitamins may help boost your immune system and potentially aid in recovery by supporting your body's natural defense mechanisms: Vitamin D3 2000 IU daily, Vitamin C 1000mg daily , and Multivitamin daily     If your symptoms do not improve with our current treatment plan or worsen, please schedule an appointment with your PCP. If you develop any high or persistent fevers, chest pain, palpitations, shortness of breath, difficulty swallowing, decreased urine output, abdominal pain, dizziness or any other concerning symptoms, please proceed to the ED.       Follow up with PCP in 3-5 days.  Proceed to  ER if symptoms worsen.    Chief Complaint     Chief Complaint   Patient presents with   • Breathing Problem     Congestion while breathing          History of Present Illness       24-year-old female presents to the clinic for evaluation of chest tightness x 2 days.  Patient reports it is hard for her to take deep breaths.  Patient reports associated symptoms including sinus congestion, runny nose and a headache.  Patient reports her symptoms are unchanged and they have not been worsening.  She denies any fevers, chills, sore throat, cough, shortness of breath, wheezing, chest pain, palpitations, nausea, vomiting, diarrhea, body aches or dizziness.  Patient reports taking OTC ibuprofen with mild relief of headache.  She does report a history of asthma and does currently have an albuterol inhaler however states she has not used it and is not sure if it is .        Breathing Problem  She complains of difficulty breathing. There is no cough, shortness of breath or wheezing. Associated symptoms include headaches and rhinorrhea. Pertinent negatives include no appetite change, chest pain, ear pain, fever, myalgias or sore throat.       Review of Systems   Review of Systems   Constitutional:  Negative for appetite change, chills and fever.   HENT:  Positive for congestion and rhinorrhea.  Negative for ear pain and sore throat.    Respiratory:  Positive for chest tightness. Negative for cough, shortness of breath and wheezing.    Cardiovascular:  Negative for chest pain and palpitations.   Gastrointestinal:  Negative for diarrhea, nausea and vomiting.   Genitourinary:  Negative for decreased urine volume.   Musculoskeletal:  Negative for arthralgias and myalgias.   Neurological:  Positive for headaches. Negative for dizziness and light-headedness.         Current Medications       Current Outpatient Medications:   •  albuterol (ProAir HFA) 90 mcg/act inhaler, Inhale 2 puffs every 6 (six) hours as needed for wheezing, Disp: 8.5 g, Rfl: 0  •  predniSONE 10 mg tablet, Take 3 tablets BID x 2 days, Take 2 tablets BID x 2 days, Take 1 tablet BID x 2 days, Take 1 tablet daily x 2 days, Disp: 26 tablet, Rfl: 0    Current Allergies     Allergies as of 03/26/2025   • (No Known Allergies)            The following portions of the patient's history were reviewed and updated as appropriate: allergies, current medications, past family history, past medical history, past social history, past surgical history and problem list.     Past Medical History:   Diagnosis Date   • Allergies    • Anxiety    • Asthma    • Wears glasses        Past Surgical History:   Procedure Laterality Date   • OTHER SURGICAL HISTORY      repair of eyelid after trauma    • WISDOM TOOTH EXTRACTION      X4        Family History   Problem Relation Age of Onset   • Asthma Mother    • Allergies Mother    • Arthritis Mother    • Cirrhosis Paternal Grandfather         LIVER   • Liver cancer Paternal Grandfather    • Diabetes Other    • Obesity Father    • Allergies Sister          Medications have been verified.        Objective   /65   Pulse 93   Temp 98.7 °F (37.1 °C)   Resp 18   SpO2 100%        Physical Exam     Physical Exam  Vitals and nursing note reviewed.   Constitutional:       Appearance: Normal appearance.   HENT:      Head:  Normocephalic and atraumatic.      Nose: Nose normal.   Cardiovascular:      Rate and Rhythm: Normal rate and regular rhythm.      Pulses: Normal pulses.      Heart sounds: Normal heart sounds.   Pulmonary:      Effort: Pulmonary effort is normal.      Breath sounds: Normal breath sounds. No stridor. No wheezing, rhonchi or rales.   Skin:     General: Skin is warm and dry.   Neurological:      General: No focal deficit present.      Mental Status: She is alert.   Psychiatric:         Mood and Affect: Mood normal.         Behavior: Behavior normal.

## 2025-04-14 ENCOUNTER — OFFICE VISIT (OUTPATIENT)
Dept: URGENT CARE | Facility: CLINIC | Age: 25
End: 2025-04-14
Payer: COMMERCIAL

## 2025-04-14 VITALS
WEIGHT: 143.8 LBS | RESPIRATION RATE: 20 BRPM | DIASTOLIC BLOOD PRESSURE: 62 MMHG | TEMPERATURE: 98.8 F | HEIGHT: 62 IN | BODY MASS INDEX: 26.46 KG/M2 | SYSTOLIC BLOOD PRESSURE: 116 MMHG | OXYGEN SATURATION: 100 % | HEART RATE: 98 BPM

## 2025-04-14 DIAGNOSIS — J02.9 SORE THROAT: ICD-10-CM

## 2025-04-14 DIAGNOSIS — J06.9 VIRAL URI: Primary | ICD-10-CM

## 2025-04-14 LAB — S PYO AG THROAT QL: NEGATIVE

## 2025-04-14 PROCEDURE — 87880 STREP A ASSAY W/OPTIC: CPT

## 2025-04-14 PROCEDURE — 87070 CULTURE OTHR SPECIMN AEROBIC: CPT

## 2025-04-14 PROCEDURE — 99213 OFFICE O/P EST LOW 20 MIN: CPT

## 2025-04-14 NOTE — PROGRESS NOTES
[facility]        NAME: Carolyn Russ is a 24 y.o. female  : 2000    MRN: [id number]  DATE: 2025  TIME: 9:08 AM    Assessment and Plan   Viral URI [J06.9]  1. Viral URI        2. Sore throat  POCT rapid ANTIGEN strepA    Throat culture    Throat culture        Rrapid strep negative in office.  Will send throat culture.  Recommend supportive care with OTC Tylenol/ibuprofen as needed for fevers or pain.  Recommend rest and increase fluid intake. Instructed patient to follow-up with PCP for no improvement or worsening of symptoms.  Patient educated on red flag symptoms and when to proceed to the ED.  Patient agreeable and understands current treatment plan.     Patient Instructions     Patient Instructions   Most colds are caused by viruses, which do not respond to antibiotics. Typically, viruses are self limiting and most people recover in 7-10 days.     While sick, make sure you get lots of rest and increase your fluid intake.   You may use OTC nasal saline spray, Flonase, and Mucinex for mild congestion.   Take Tylenol or Ibuprofen for fever, mild pain, and/or body aches.  Perform salt water gargles, drink warm tea with honey, and use throat lozenges and Chloraseptic spray for sore throat.    You can also apply warm compresses over your sinuses for any sinus pressure.     While you are sick, taking vitamins may help boost your immune system and potentially aid in recovery by supporting your body's natural defense mechanisms: Vitamin D3 2000 IU daily, Vitamin C 1000mg daily , and Multivitamin daily.    We have performed some tests on you during your visit with us. Our office will contact you with these results only if changes need to be made to the care plan previously discussed with you at your visit. You can review your results on [facility] Mychart. Please don't hesitate to call if you have any questions regarding your results and/or treatment.     If your symptoms do not improve with our  current treatment plan or worsen, please schedule an appointment with your PCP. If you develop any high or persistent fevers, chest pain, palpitations, shortness of breath, difficulty swallowing, decreased urine output, abdominal pain, dizziness or any other concerning symptoms, please proceed to the ED.       Follow up with PCP in 3-5 days.  Proceed to  ER if symptoms worsen.    Chief Complaint     Chief Complaint   Patient presents with   • Sore Throat   • Headache   • Cough     Generalized cold symptoms with congestion, ear, sinus pressure and sore throat past 3 days.         History of Present Illness       24-year-old female presents to the clinic for evaluation of sore throat x 3 days.  Patient reports associated symptoms including sinus congestion, left ear pain, runny nose, pain when swallowing and headaches.  Patient denies any fevers, chills, cough, shortness of breath, chest pain, nausea, vomiting, diarrhea, body aches, dizziness or headaches.  Patient denies taking any OTC medications for symptoms at this time.           Sore Throat   Associated symptoms include congestion, ear pain (left side), headaches and trouble swallowing (pain with swallowing). Pertinent negatives include no coughing, diarrhea, shortness of breath or vomiting.   Headache  Cough  Associated symptoms include ear pain (left side), headaches, rhinorrhea and a sore throat. Pertinent negatives include no chest pain, chills, fever, myalgias, shortness of breath or wheezing.       Review of Systems   Review of Systems   Constitutional:  Negative for appetite change, chills and fever.   HENT:  Positive for congestion, ear pain (left side), rhinorrhea, sore throat and trouble swallowing (pain with swallowing).    Respiratory:  Negative for cough, chest tightness, shortness of breath and wheezing.    Cardiovascular:  Negative for chest pain and palpitations.   Gastrointestinal:  Negative for diarrhea, nausea and vomiting.   Genitourinary:   "Negative for decreased urine volume.   Musculoskeletal:  Negative for arthralgias and myalgias.   Neurological:  Positive for headaches. Negative for dizziness and light-headedness.         Current Medications       Current Outpatient Medications:   •  albuterol (ProAir HFA) 90 mcg/act inhaler, Inhale 2 puffs every 6 (six) hours as needed for wheezing, Disp: 8.5 g, Rfl: 0  •  predniSONE 10 mg tablet, Take 3 tablets BID x 2 days, Take 2 tablets BID x 2 days, Take 1 tablet BID x 2 days, Take 1 tablet daily x 2 days (Patient not taking: Reported on 4/14/2025), Disp: 26 tablet, Rfl: 0    Current Allergies     Allergies as of 04/14/2025   • (No Known Allergies)            The following portions of the patient's history were reviewed and updated as appropriate: allergies, current medications, past family history, past medical history, past social history, past surgical history and problem list.     Past Medical History:   Diagnosis Date   • Allergies    • Anxiety    • Asthma    • Wears glasses        Past Surgical History:   Procedure Laterality Date   • OTHER SURGICAL HISTORY      repair of eyelid after trauma    • WISDOM TOOTH EXTRACTION      X4        Family History   Problem Relation Age of Onset   • Asthma Mother    • Allergies Mother    • Arthritis Mother    • Cirrhosis Paternal Grandfather         LIVER   • Liver cancer Paternal Grandfather    • Diabetes Other    • Obesity Father    • Allergies Sister          Medications have been verified.        Objective   /62   Pulse 98   Temp 98.8 °F (37.1 °C)   Resp 20   Ht 5' 2\" (1.575 m)   Wt 65.2 kg (143 lb 12.8 oz)   SpO2 100%   BMI 26.30 kg/m²        Physical Exam     Physical Exam  Vitals and nursing note reviewed.   Constitutional:       Appearance: She is well-developed.   HENT:      Head: Normocephalic and atraumatic.      Right Ear: Tympanic membrane and ear canal normal.      Left Ear: Tympanic membrane and ear canal normal.      Nose: Congestion and " rhinorrhea present.      Mouth/Throat:      Mouth: Mucous membranes are moist. No oral lesions.      Pharynx: Oropharynx is clear. Uvula midline. Posterior oropharyngeal erythema present. No pharyngeal swelling, oropharyngeal exudate or uvula swelling.      Tonsils: No tonsillar exudate or tonsillar abscesses.   Cardiovascular:      Rate and Rhythm: Normal rate and regular rhythm.      Heart sounds: Normal heart sounds.   Pulmonary:      Effort: Pulmonary effort is normal.      Breath sounds: Normal breath sounds.   Lymphadenopathy:      Cervical: No cervical adenopathy.   Skin:     General: Skin is warm and dry.   Neurological:      General: No focal deficit present.      Mental Status: She is alert.   Psychiatric:         Mood and Affect: Mood normal.         Behavior: Behavior normal.

## 2025-04-14 NOTE — PATIENT INSTRUCTIONS
Most colds are caused by viruses, which do not respond to antibiotics. Typically, viruses are self limiting and most people recover in 7-10 days.     While sick, make sure you get lots of rest and increase your fluid intake.   You may use OTC nasal saline spray, Flonase, and Mucinex for mild congestion.   Take Tylenol or Ibuprofen for fever, mild pain, and/or body aches.  Perform salt water gargles, drink warm tea with honey, and use throat lozenges and Chloraseptic spray for sore throat.    You can also apply warm compresses over your sinuses for any sinus pressure.     While you are sick, taking vitamins may help boost your immune system and potentially aid in recovery by supporting your body's natural defense mechanisms: Vitamin D3 2000 IU daily, Vitamin C 1000mg daily , and Multivitamin daily.    We have performed some tests on you during your visit with us. Our office will contact you with these results only if changes need to be made to the care plan previously discussed with you at your visit. You can review your results on [facility] Mychart. Please don't hesitate to call if you have any questions regarding your results and/or treatment.     If your symptoms do not improve with our current treatment plan or worsen, please schedule an appointment with your PCP. If you develop any high or persistent fevers, chest pain, palpitations, shortness of breath, difficulty swallowing, decreased urine output, abdominal pain, dizziness or any other concerning symptoms, please proceed to the ED.

## 2025-04-16 LAB — BACTERIA THROAT CULT: NORMAL

## 2025-04-21 ENCOUNTER — TELEPHONE (OUTPATIENT)
Dept: SURGERY | Facility: CLINIC | Age: 25
End: 2025-04-21

## 2025-04-27 ENCOUNTER — OFFICE VISIT (OUTPATIENT)
Dept: URGENT CARE | Facility: CLINIC | Age: 25
End: 2025-04-27
Payer: COMMERCIAL

## 2025-04-27 VITALS
OXYGEN SATURATION: 100 % | DIASTOLIC BLOOD PRESSURE: 68 MMHG | HEART RATE: 75 BPM | RESPIRATION RATE: 18 BRPM | TEMPERATURE: 98.2 F | SYSTOLIC BLOOD PRESSURE: 110 MMHG

## 2025-04-27 DIAGNOSIS — J01.90 ACUTE SINUSITIS, RECURRENCE NOT SPECIFIED, UNSPECIFIED LOCATION: Primary | ICD-10-CM

## 2025-04-27 PROCEDURE — 99213 OFFICE O/P EST LOW 20 MIN: CPT | Performed by: PHYSICIAN ASSISTANT

## 2025-04-27 RX ORDER — FLUTICASONE PROPIONATE 50 MCG
2 SPRAY, SUSPENSION (ML) NASAL DAILY
Qty: 18.2 ML | Refills: 0 | Status: SHIPPED | OUTPATIENT
Start: 2025-04-27

## 2025-04-27 NOTE — PROGRESS NOTES
[facility]    NAME: Carolyn Russ is a 24 y.o. female  : 2000    MRN: [id number]  DATE: 2025  TIME: 12:58 PM    Assessment and Plan   Acute sinusitis, recurrence not specified, unspecified location [J01.90]  1. Acute sinusitis, recurrence not specified, unspecified location  amoxicillin-clavulanate (AUGMENTIN) 875-125 mg per tablet    fluticasone (FLONASE) 50 mcg/act nasal spray          Patient Instructions     Patient Instructions   I have prescribed an antibiotic for the infection.  Please take the antibiotic as prescribed and finish the entire prescription.  Take an over the counter probiotic or eat yogurt with live cultures in it (activia) to keep good bacteria in the gut and help prevent diarrhea.  Wash hands frequently to prevent the spread of infection.  Can use over the counter cough and cold medications to help with symptoms.  Ibuprofen and/or tylenol as needed for pain or fever.  If not improving over the next 7-10 days, follow up with PCP.      Flonase  Over the counter zyrtec or claritin     Chief Complaint     Chief Complaint   Patient presents with    Sore Throat     Sore throat ear pain nasal congestion over 10 days. Was seen and tested for strep on        History of Present Illness   24-year-old here with sore throat, nasal congestion and cough for the last 2 weeks.  Right earache and right-sided sinus pressure. no fever or chills.        Review of Systems   Review of Systems   Constitutional:  Negative for appetite change, chills and fever.   HENT:  Positive for congestion, postnasal drip, sinus pressure and sore throat. Negative for ear discharge, ear pain, facial swelling and sneezing.    Respiratory:  Positive for cough. Negative for shortness of breath and wheezing.    Neurological:  Negative for headaches.       Current Medications     Current Outpatient Medications:     albuterol (ProAir HFA) 90 mcg/act inhaler, Inhale 2 puffs every 6 (six) hours as needed for  wheezing, Disp: 8.5 g, Rfl: 0    amoxicillin-clavulanate (AUGMENTIN) 875-125 mg per tablet, Take 1 tablet by mouth every 12 (twelve) hours for 10 days, Disp: 20 tablet, Rfl: 0    fluticasone (FLONASE) 50 mcg/act nasal spray, 2 sprays into each nostril daily, Disp: 18.2 mL, Rfl: 0    predniSONE 10 mg tablet, Take 3 tablets BID x 2 days, Take 2 tablets BID x 2 days, Take 1 tablet BID x 2 days, Take 1 tablet daily x 2 days (Patient not taking: Reported on 4/27/2025), Disp: 26 tablet, Rfl: 0    Current Allergies     Allergies as of 04/27/2025    (No Known Allergies)          The following portions of the patient's history were reviewed and updated as appropriate: allergies, current medications, past family history, past medical history, past social history, past surgical history and problem list.   Past Medical History:   Diagnosis Date    Allergies     Anxiety     Asthma     Wears glasses      Past Surgical History:   Procedure Laterality Date    OTHER SURGICAL HISTORY      repair of eyelid after trauma     WISDOM TOOTH EXTRACTION      X4      Family History   Problem Relation Age of Onset    Asthma Mother     Allergies Mother     Arthritis Mother     Cirrhosis Paternal Grandfather         LIVER    Liver cancer Paternal Grandfather     Diabetes Other     Obesity Father     Allergies Sister      Social History     Socioeconomic History    Marital status: Single     Spouse name: Not on file    Number of children: 0    Years of education: Not on file    Highest education level: Not on file   Occupational History    Occupation: [employer]      Comment: full-time    Tobacco Use    Smoking status: Never    Smokeless tobacco: Never   Vaping Use    Vaping status: Never Used   Substance and Sexual Activity    Alcohol use: Not Currently     Comment: No use    Drug use: Never     Comment: No use    Sexual activity: Not on file   Other Topics Concern    Not on file   Social History Narrative    Always uses seatbelts    No caffeine  use        Patient lives in an apartment     Eelctric/forced hot air heat     No basement     No dehumidifier     Humidifier used daily     No air  or purifiers     Home is smoke free     [place]     Carpet yung in the bedroom         Cat (Darline) and she is allowed in the bedroom     Dog (Carolyn and Elida) they are allowed in the bedroom         Caffeine: 1 cup of hot tea daily                     Soda is consumed rarely     Chocolate consumed rarely         Patient has 3 roommates      Social Drivers of Health     Financial Resource Strain: Not on file   Food Insecurity: Not on file   Transportation Needs: Not on file   Physical Activity: Inactive (12/13/2019)    Exercise Vital Sign     Days of Exercise per Week: 0 days     Minutes of Exercise per Session: 0 min   Stress: Not on file   Social Connections: Not on file   Intimate Partner Violence: Not on file   Housing Stability: Not on file     Medications have been verified.    Objective   /68   Pulse 75   Temp 98.2 °F (36.8 °C)   Resp 18   SpO2 100%      Physical Exam   Physical Exam  Vitals and nursing note reviewed.   Constitutional:       General: She is not in acute distress.     Appearance: She is well-developed.   HENT:      Head: Normocephalic and atraumatic.      Right Ear: Tympanic membrane normal.      Left Ear: Tympanic membrane normal.      Nose: Congestion present. No mucosal edema.      Right Sinus: No maxillary sinus tenderness or frontal sinus tenderness.      Left Sinus: No maxillary sinus tenderness or frontal sinus tenderness.      Mouth/Throat:      Pharynx: Posterior oropharyngeal erythema present. No oropharyngeal exudate.   Eyes:      Conjunctiva/sclera: Conjunctivae normal.   Cardiovascular:      Rate and Rhythm: Normal rate and regular rhythm.      Heart sounds: Normal heart sounds. No murmur heard.

## 2025-04-27 NOTE — LETTER
April 27, 2025     Patient: Carolyn Russ   YOB: 2000   Date of Visit: 4/27/2025       To Whom It May Concern:    It is my medical opinion that Carolyn Russ may return to work on 4/28/25.    If you have any questions or concerns, please don't hesitate to call.         Sincerely,        Michelle Behler, PA-C    CC: No Recipients

## 2025-04-27 NOTE — PATIENT INSTRUCTIONS
I have prescribed an antibiotic for the infection.  Please take the antibiotic as prescribed and finish the entire prescription.  Take an over the counter probiotic or eat yogurt with live cultures in it (activia) to keep good bacteria in the gut and help prevent diarrhea.  Wash hands frequently to prevent the spread of infection.  Can use over the counter cough and cold medications to help with symptoms.  Ibuprofen and/or tylenol as needed for pain or fever.  If not improving over the next 7-10 days, follow up with PCP.      Flonase  Over the counter zyrtec or claritin

## 2025-08-01 ENCOUNTER — OFFICE VISIT (OUTPATIENT)
Dept: URGENT CARE | Facility: CLINIC | Age: 25
End: 2025-08-01
Payer: COMMERCIAL

## 2025-08-01 VITALS
WEIGHT: 144 LBS | DIASTOLIC BLOOD PRESSURE: 74 MMHG | TEMPERATURE: 98.3 F | HEIGHT: 62 IN | OXYGEN SATURATION: 100 % | BODY MASS INDEX: 26.5 KG/M2 | RESPIRATION RATE: 16 BRPM | HEART RATE: 88 BPM | SYSTOLIC BLOOD PRESSURE: 112 MMHG

## 2025-08-01 DIAGNOSIS — J45.901 MILD ASTHMA EXACERBATION: Primary | ICD-10-CM

## 2025-08-01 PROCEDURE — 99213 OFFICE O/P EST LOW 20 MIN: CPT

## 2025-08-01 RX ORDER — ALBUTEROL SULFATE 90 UG/1
2 INHALANT RESPIRATORY (INHALATION) EVERY 6 HOURS PRN
Qty: 8.5 G | Refills: 0 | Status: SHIPPED | OUTPATIENT
Start: 2025-08-01